# Patient Record
Sex: FEMALE | URBAN - NONMETROPOLITAN AREA
[De-identification: names, ages, dates, MRNs, and addresses within clinical notes are randomized per-mention and may not be internally consistent; named-entity substitution may affect disease eponyms.]

---

## 2019-06-27 ENCOUNTER — HISTORICAL (OUTPATIENT)
Dept: ADMINISTRATIVE | Facility: HOSPITAL | Age: 54
End: 2019-06-27

## 2019-12-09 ENCOUNTER — HISTORICAL (OUTPATIENT)
Dept: ADMINISTRATIVE | Facility: HOSPITAL | Age: 54
End: 2019-12-09

## 2021-09-07 ENCOUNTER — HISTORICAL (OUTPATIENT)
Dept: ADMINISTRATIVE | Facility: HOSPITAL | Age: 56
End: 2021-09-07

## 2022-07-21 ENCOUNTER — HISTORICAL (OUTPATIENT)
Dept: ADMINISTRATIVE | Facility: HOSPITAL | Age: 57
End: 2022-07-21

## 2024-06-05 ENCOUNTER — HOSPITAL ENCOUNTER (OUTPATIENT)
Dept: RADIOLOGY | Facility: HOSPITAL | Age: 59
Discharge: HOME | End: 2024-06-05
Attending: INTERNAL MEDICINE
Payer: MEDICARE

## 2024-06-05 DIAGNOSIS — I89.0 OBLITERATION OF LYMPHATIC VESSEL: ICD-10-CM

## 2024-06-05 PROCEDURE — 75571 CT HRT W/O DYE W/CA TEST: CPT | Mod: TC

## 2024-07-25 ENCOUNTER — HOSPITAL ENCOUNTER (EMERGENCY)
Facility: HOSPITAL | Age: 59
Discharge: HOME | End: 2024-07-25
Payer: MEDICARE

## 2024-07-25 VITALS
DIASTOLIC BLOOD PRESSURE: 64 MMHG | BODY MASS INDEX: 47.09 KG/M2 | OXYGEN SATURATION: 99 % | RESPIRATION RATE: 18 BRPM | TEMPERATURE: 97 F | SYSTOLIC BLOOD PRESSURE: 130 MMHG | WEIGHT: 293 LBS | HEART RATE: 61 BPM | HEIGHT: 66 IN

## 2024-07-25 DIAGNOSIS — S32.028A OTHER CLOSED FRACTURE OF SECOND LUMBAR VERTEBRA, INITIAL ENCOUNTER: Primary | ICD-10-CM

## 2024-07-25 PROBLEM — S32.029A CLOSED FRACTURE OF SECOND LUMBAR VERTEBRA: Status: ACTIVE | Noted: 2024-07-25

## 2024-07-25 PROCEDURE — 99285 EMERGENCY DEPT VISIT HI MDM: CPT | Mod: 25

## 2024-07-25 PROCEDURE — 96372 THER/PROPH/DIAG INJ SC/IM: CPT | Performed by: NURSE PRACTITIONER

## 2024-07-25 PROCEDURE — 25000003 PHARM REV CODE 250: Performed by: NURSE PRACTITIONER

## 2024-07-25 PROCEDURE — 63600175 PHARM REV CODE 636 W HCPCS: Performed by: NURSE PRACTITIONER

## 2024-07-25 RX ORDER — CYCLOBENZAPRINE HCL 10 MG
10 TABLET ORAL
Status: COMPLETED | OUTPATIENT
Start: 2024-07-25 | End: 2024-07-25

## 2024-07-25 RX ORDER — CYCLOBENZAPRINE HCL 10 MG
10 TABLET ORAL 3 TIMES DAILY PRN
Qty: 15 TABLET | Refills: 0 | Status: SHIPPED | OUTPATIENT
Start: 2024-07-25 | End: 2024-07-30

## 2024-07-25 RX ORDER — HYDROCODONE BITARTRATE AND ACETAMINOPHEN 5; 325 MG/1; MG/1
1 TABLET ORAL EVERY 6 HOURS PRN
Qty: 12 TABLET | Refills: 0 | Status: SHIPPED | OUTPATIENT
Start: 2024-07-25

## 2024-07-25 RX ORDER — METHYLPREDNISOLONE SOD SUCC 125 MG
125 VIAL (EA) INJECTION
Status: COMPLETED | OUTPATIENT
Start: 2024-07-25 | End: 2024-07-25

## 2024-07-25 RX ADMIN — CYCLOBENZAPRINE 10 MG: 10 TABLET, FILM COATED ORAL at 12:07

## 2024-07-25 RX ADMIN — METHYLPREDNISOLONE SODIUM SUCCINATE 125 MG: 125 INJECTION, POWDER, FOR SOLUTION INTRAMUSCULAR; INTRAVENOUS at 12:07

## 2024-07-25 NOTE — DISCHARGE INSTRUCTIONS
If you have any increase in pain that is not controlled with pain medicine, numbness, tingling or other concerns please return to the ER for further evaluation  Wear back brace as per  Clinic representative

## 2024-07-25 NOTE — ED PROVIDER NOTES
Encounter Date: 7/25/2024       History     Chief Complaint   Patient presents with    Fall     PT C/O right sided soreness all over body and lower back pain after falling yesterday. Denies hitting head or LOC     This 59-year-old female patient presents with complaints of lower back pain after [place].  She denies numbness, tingling.       Review of patient's allergies indicates:  No Known Allergies  Past Medical History:   Diagnosis Date    Depression     Hypertension     Obesity, unspecified      History reviewed. No pertinent surgical history.  No family history on file.     Review of Systems   Musculoskeletal:  Positive for back pain.   All other systems reviewed and are negative.      Physical Exam     Initial Vitals [07/25/24 1220]   BP Pulse Resp Temp SpO2   (!) 159/72 (!) 56 18 97.2 °F (36.2 °C) 99 %      MAP       --         Physical Exam    Nursing note and vitals reviewed.  Constitutional: She appears well-developed and well-nourished.   HENT:   Head: Normocephalic and atraumatic.   Eyes: Pupils are equal, round, and reactive to light.   Neck: Neck supple.   Normal range of motion.  Cardiovascular:  Normal rate, regular rhythm, normal heart sounds and intact distal pulses.           Pulmonary/Chest: Breath sounds normal.   Musculoskeletal:      Cervical back: Normal range of motion and neck supple.      Comments: Upper extremities full range of motion, lower extremities limited range of motion, patient ambulates with a walker, she is unable to legs or extend her back due to pain     Neurological: She is alert and oriented to person, place, and time.   Skin: Skin is warm and dry. Capillary refill takes less than 2 seconds.   Psychiatric: She has a normal mood and affect. Her behavior is normal. Judgment and thought content normal.         ED Course   Procedures  Labs Reviewed - No data to display       Imaging Results              CT Lumbar Spine Without Contrast (Final result)   Result time 07/25/24 13:13:00      Final result by Anthony Gillespie MD (07/25/24 13:13:00)                   Impression:      Nondisplaced fracture of L2 vertebral body    Advanced lumbar spondylosis with multilevel canal and foraminal stenosis.  Findings could be better characterized with MRI lumbar spine or CT lumbar myelography as clinically indicated.      Electronically signed by: Anthony Gillespie MD  Date:    07/25/2024  Time:    13:13               Narrative:    EXAMINATION:  CT lumbar spine without contrast    CLINICAL HISTORY:  Low back pain, increased risk for fracture    TECHNIQUE:  Routine CT lumbar spine was performed without intravenous contrast    Total DLP: 5314 mGy.cm    Automatic exposure control was utilized to reduce the patient's dose    COMPARISON:  None    FINDINGS:  The alignment appears normal.  Vertebral body heights are maintained.  There is a nondisplaced fracture through the anterior superior endplate of L2 with fracture line extending laterally to the right aspect of the midportion of L2 vertebral body.  No other fracture seen.    There are advanced spondylotic changes of the lumbar spine consisting of multilevel disc space narrowing, anterior spurs, posterior disc osteophyte complex and facet arthropathy.  There is multilevel osseous canal or osseous neural foraminal stenosis.                                       CT Thoracic Spine Without Contrast (Final result)  Result time 07/25/24 13:04:14      Final result by Alonso Riley MD (07/25/24 13:04:14)                   Impression:        1.  DIFFUSE BRIDGING SPONDYLOSIS OF THE THORACIC SPINE WITH A GENTLE CHRONIC APPEARING ACCENTUATION TO THE THORACIC KYPHOSIS AND THERE IS A 25% AP NARROWING OF THE CENTRAL CANAL AT T5-6 SECONDARY TO POSTERIOR BRIDGING VERTEBRAL BODY SPURRING.    2.   I SEE NO EVIDENCE OF ACUTE FRACTURES OR SIGNIFICANT SPONDYLOISTHESIS.  SEE ABOVE COMMENTS REGARDING LIMITATIONS OF THIS EXAMINATION.    n/a    CATEGORY:  n/a    The following dose reduction techniques are used for all CT at [facility]:    1.  Automated exposure control.    2.  Adjustment of the mA and/or kV according to patient size.    3.  Use of interative reconstruction technique.      Electronically signed by: Alonso Riley  Date:    07/25/2024  Time:    13:04               Narrative:    EXAMINATION:  CT THORACIC SPINE WITHOUT CONTRAST    CLINICAL HISTORY:  Mid-back pain;    COMPARISON:  None    FINDINGS:  Multiplanar imaging through the thoracic spine without intravenous contrast was performed.  Diffuse bridging vertebral body spurring is present throughout the thoracic spine.  Focal posterior bridging/endplate spurring is present at T5-6 and in an resulting in AP narrowing of 25% of the cervical canal.  There is a gentle chronic appearing accentuation to the thoracic kyphosis.  It should be noted the posterior margins of the intervertebral discs as well as the  cord, exiting nerve roots and ligamentous structures are less than optimally delineated on CT and would be better evaluated with MRI or CT meylography, if felt to be clinically indicated.  I see no evidence of acute fractures or significant spondylolisthesis.  There is no evidence of focal soft tissue swelling or paravertebral hematomas.                                       Medications   methylPREDNISolone sodium succinate injection 125 mg (125 mg Intramuscular Given 7/25/24 1230)   cyclobenzaprine tablet 10 mg (10 mg Oral Given 7/25/24 1230)     Medical Decision Making  This 59-year-old female patient presents with complaints of lower back pain after she [place] yesterday [place] court [place].  She denies numbness, tingling.   CT thoracic shows chronic spondylosis and CT lumbar shows a nondisplaced fracture L2  I discussed this patient with Dr. Harkins he suggest calling the  Clinic to fit the patient for brace and referred to Neurosurgery.  ER diagnosis-other closed fracture  of 2nd lumbar vertebrae initial encounter  Differential diagnosis includes but is not limited to thoracic fracture, disc herniation, both of these diagnoses are less likely related to exam and CT results  This patient will be discharged home stable.  She will follow up with Neurosurgery.  If she experiences any increase in pain that is not resolved with pain medicine or numbness, tingling or other concerns she will return to the ER for further evaluation.  Brace placed on patient and she was instructed by the rep on its use and fitting.  12:05 p.m.- I spoke with the [facility] in [place] concerning fitting this patient for a TLSO brace.  They requested we faxed the order, face sheet, height, weight and they will be here within the hour to sit this patient  1548- representative from [facility] in room for patient evaluation for brace    Amount and/or Complexity of Data Reviewed  Radiology: ordered.    Risk  Prescription drug management.                                      Clinical Impression:  Final diagnoses:  [S32.028A] Other closed fracture of second lumbar vertebra, initial encounter (Primary)          ED Disposition Condition    Discharge Stable          ED Prescriptions       Medication Sig Dispense Start Date End Date Auth. Provider    cyclobenzaprine (FLEXERIL) 10 MG tablet Take 1 tablet (10 mg total) by mouth 3 (three) times daily as needed for Muscle spasms. 15 tablet 7/25/2024 7/30/2024 Amy Suarez FNP    HYDROcodone-acetaminophen (NORCO) 5-325 mg per tablet Take 1 tablet by mouth every 6 (six) hours as needed for Pain. 12 tablet 7/25/2024 -- Amy Suarez FNP          Follow-up Information       Follow up With Specialties Details Why Contact Info Additional Information    [facility] Neurosurgery Neurosurgery Call today to schedule appointment [address] [place] [address] , [address]  [facility] [address] [phone] [address]             Amy Suarez FNP  07/25/24 4901

## 2024-08-14 DIAGNOSIS — S32.009A: Primary | ICD-10-CM

## 2024-08-16 ENCOUNTER — TELEPHONE (OUTPATIENT)
Dept: NEUROSURGERY | Facility: CLINIC | Age: 59
End: 2024-08-16
Payer: MEDICARE

## 2024-08-16 NOTE — TELEPHONE ENCOUNTER
If she wants to see Dr. Ramirez, no imaging.  If she wants to see Dr. Little, lumbar x-rays, 3 views.  Should see her within 2-3 weeks.

## 2024-08-16 NOTE — TELEPHONE ENCOUNTER
"58yo is being referred to Dr. Mcneil by Zoey Lopez NP for fracture of lumbar spine. ED note dated 7/25/2024 states, " This 59-year-old female patient presents with complaints of lower back pain after she [place]. She denies numbness, tingling. "  " This 59-year-old female patient presents with complaints of lower back pain after she [place] court [place].  She denies numbness, tingling.   CT thoracic shows chronic spondylosis and CT lumbar shows a nondisplaced fracture L2  I discussed this patient with Dr. Harkins he suggest calling the  Clinic to fit the patient for brace and referred to Neurosurgery.  ER diagnosis-other closed fracture of 2nd lumbar vertebrae initial encounter  Differential diagnosis includes but is not limited to thoracic fracture, disc herniation, both of these diagnoses are less likely related to exam and CT results  This patient will be discharged home stable.  She will follow up with Neurosurgery.  If she experiences any increase in pain that is not resolved with pain medicine or numbness, tingling or other concerns she will return to the ER for further evaluation.  Brace placed on patient and she was instructed by the rep on its use and fitting.  12:05 p.m.- I spoke with the  Clinic in [place] concerning fitting this patient for a TLSO brace.  They requested we faxed the order, face sheet, height, weight and they will be here within the hour to sit this patient"   By reviewing the CT lumbar report, " Advanced lumbar spondylosis with multilevel canal and foraminal stenosis. Findings could be better characterized with MRI lumbar spine or CT lumbar myelography as clinically indicated. " Please review and advise on scheduling. Thank you!       STAFF: please inquire if the pain radiates down into any extremity, what type of pain it is, does it wake her up, what meds is she on, any recent testing, MD's, sx's or conservative measures the " patient may have had and obtain. *please inquire about which MD she would like to move forward with since Dr. Mcneil is retired.

## 2024-08-22 ENCOUNTER — LAB VISIT (OUTPATIENT)
Dept: LAB | Facility: HOSPITAL | Age: 59
End: 2024-08-22
Attending: INTERNAL MEDICINE
Payer: MEDICARE

## 2024-08-22 DIAGNOSIS — I89.0 OBLITERATION OF LYMPHATIC VESSEL: ICD-10-CM

## 2024-08-22 DIAGNOSIS — I10 HYPERTENSION, UNSPECIFIED TYPE: ICD-10-CM

## 2024-08-22 DIAGNOSIS — R53.83 FATIGUE, UNSPECIFIED TYPE: Primary | ICD-10-CM

## 2024-08-22 LAB
ALBUMIN SERPL-MCNC: 3.5 G/DL (ref 3.4–5)
ALBUMIN/GLOB SERPL: 1.3 RATIO
ALP SERPL-CCNC: 147 UNIT/L (ref 50–144)
ALT SERPL-CCNC: 12 UNIT/L (ref 1–45)
ANION GAP SERPL CALC-SCNC: 4 MEQ/L (ref 2–13)
AST SERPL-CCNC: 18 UNIT/L (ref 14–36)
BILIRUB SERPL-MCNC: 0.4 MG/DL (ref 0–1)
BUN SERPL-MCNC: 23 MG/DL (ref 7–20)
CALCIUM SERPL-MCNC: 9.3 MG/DL (ref 8.4–10.2)
CHLORIDE SERPL-SCNC: 104 MMOL/L (ref 98–110)
CHOLEST SERPL-MCNC: 133 MG/DL (ref 0–200)
CO2 SERPL-SCNC: 30 MMOL/L (ref 21–32)
CREAT SERPL-MCNC: 1.22 MG/DL (ref 0.66–1.25)
CREAT/UREA NIT SERPL: 19 (ref 12–20)
GFR SERPLBLD CREATININE-BSD FMLA CKD-EPI: 51 ML/MIN/1.73/M2
GLOBULIN SER-MCNC: 2.8 GM/DL (ref 2–3.9)
GLUCOSE SERPL-MCNC: 84 MG/DL (ref 70–115)
HDLC SERPL-MCNC: 80 MG/DL (ref 40–60)
LDLC SERPL DIRECT ASSAY-SCNC: 43.6 MG/DL (ref 30–100)
POTASSIUM SERPL-SCNC: 4.7 MMOL/L (ref 3.5–5.1)
PROT SERPL-MCNC: 6.3 GM/DL (ref 6.3–8.2)
SODIUM SERPL-SCNC: 138 MMOL/L (ref 136–145)
TRIGL SERPL-MCNC: 71 MG/DL (ref 30–200)

## 2024-08-22 PROCEDURE — 36415 COLL VENOUS BLD VENIPUNCTURE: CPT

## 2024-08-22 PROCEDURE — 80061 LIPID PANEL: CPT

## 2024-08-22 PROCEDURE — 80053 COMPREHEN METABOLIC PANEL: CPT

## 2024-08-23 DIAGNOSIS — S32.008A OTHER CLOSED FRACTURE OF LUMBAR VERTEBRA, UNSPECIFIED LUMBAR VERTEBRAL LEVEL, INITIAL ENCOUNTER: Primary | ICD-10-CM

## 2024-08-27 NOTE — PROGRESS NOTES
[facility]  History & Physical  Neurosurgery      Kimberley Davenport   [id number]   1965       SUBJECTIVE:     CHIEF COMPLAINT:  L2 Compression fracture    HPI:  Kimberley Davenport is a 59 y.o. female who presents for neurosurgical evaluation at the recommendation of Zoey Lopez NP. The patient presents today describing chronic lower back pain radiating into the right lower extremity which the patient states has been present for more than 40 years.  She does have a significant history of a previous lumbar decompression that was completed approximately 40 years ago.  The patient states despite this procedure her symptoms have persisted.  She states this pain has caused her to have to discontinue working as standing and walking typically aggravates things the worst.  She does find relief with sitting typically.  Unfortunately the patient experienced a fall on 7/24/2024 in which she states she slipped and landed on her right side.  This did bring about excruciating pain.  The patient presented to the emergency department and was found to have an L2 nondisplaced fracture.  She was provided a TLSO brace and referred here for further evaluation.  The patient states she continues to have dull aching and occasional spasming pain into the axial mid to lower back.  She rates this pain an 8/10.  She states thankfully her chronic radicular right buttock, lateral thigh and right lower extremity pain has remained stable.  She reports unfortunately her symptoms are affecting her sleep at night.  She finds her TLSO brace very uncomfortable although states she has remained relatively compliant with this device.  She unfortunately is having to sleep in her recliner as lying flat is very uncomfortable and painful.  She does have a history of urinary and bowel urgency although this has remained stable.  She will occasionally have difficulties with her balance secondary to pain and does ambulate with a walker.   The patient has a significant history of depression, hypertension and obesity.  She reports in the past she has undergone transforaminal epidural steroid injections in [facility] with no significant changes symptoms.  She was previously established with Dr. Brice in [place] for her lumbar radicular symptoms.  She advises me she was told she was not a surgical candidate to her body habitus.  She recently attempted her brother's TENs unit which allowed her to stand and cook a meal with minimal pain.    Past Medical History:   Diagnosis Date    Depression     Hypertension     Obesity, unspecified        Past Surgical History:   Procedure Laterality Date    APPENDECTOMY      BLADDER REPAIR      CHOLECYSTECTOMY      DECOMPRESSION OF LUMBAR SPINE USING MINIMALLY INVASIVE TECHNIQUE      HYSTERECTOMY      LUMBAR SPINE SURGERY      TOTAL KNEE ARTHROPLASTY Right        Family History   Problem Relation Name Age of Onset    Hypertension Mother      Lumbar disc disease Mother      Alcohol abuse Father      Emphysema Father      Alcohol abuse Sister      COPD Sister      Lumbar disc disease Brother      Cancer Brother      Alcohol abuse Brother      Dementia Brother      Dementia Maternal Grandmother         Social History     Socioeconomic History    Marital status:    Tobacco Use    Smoking status: Never    Smokeless tobacco: Never   Substance and Sexual Activity    Alcohol use: Never        Review of patient's allergies indicates:  No Known Allergies     Current Outpatient Medications   Medication Instructions    buPROPion (WELLBUTRIN XL) 300 mg, Oral    citalopram (CELEXA) 10 mg, Oral    cyclobenzaprine (FLEXERIL) 10 mg, Oral, Nightly PRN    HYDROcodone-acetaminophen (NORCO) 5-325 mg per tablet 1 tablet, Oral, Every 8 hours PRN    lisinopriL (PRINIVIL,ZESTRIL) 20 mg, Oral    pregabalin (LYRICA) 150 mg, Oral, 3 times daily    rOPINIRole (REQUIP) 1 mg, Oral    rosuvastatin (CRESTOR) 5 mg, Oral     "TENS unit and electrodes Cmpk 1 Units, Misc.(Non-Drug; Combo Route), Daily          Review of Systems   Constitutional:  Negative for chills, fever and weight loss.   HENT:  Negative for congestion, hearing loss, nosebleeds and tinnitus.    Eyes:  Negative for blurred vision, double vision and photophobia.   Respiratory:  Negative for cough, shortness of breath and wheezing.    Cardiovascular:  Negative for chest pain, palpitations and leg swelling.   Gastrointestinal:  Negative for constipation, diarrhea, nausea and vomiting.   Genitourinary:  Negative for dysuria, frequency and urgency.   Musculoskeletal:  Positive for back pain. Negative for falls and neck pain.   Skin:  Negative for itching and rash.   Neurological:  Positive for tingling (burning right lateral thigh and buttock). Negative for dizziness, tremors, sensory change, speech change, seizures, loss of consciousness and headaches.   Psychiatric/Behavioral:  Negative for depression, hallucinations and memory loss. The patient is not nervous/anxious.        OBJECTIVE:     Visit Vitals  /67 (BP Location: Left arm, Patient Position: Sitting)   Pulse 69   Resp 16   Ht 5' 6" (1.676 m)   Wt (!) 162.8 kg (359 lb)   BMI 57.94 kg/m²        Physical Exam    General:  Pleasant, Obese, Well-groomed.    Cardiovascular:  Neck is supple.  There are no carotid bruits.  Heart has regular rate and rhythm.    Lungs:  Breathing is quiet, non-lablored    Abdomen:  Soft, non-tender, non-distended.    Neurological:  Muscle strength against resistance:   Right Left   Deltoid (C5) 5/5 5/5   Biceps (C5/6) 5/5 5/5   Wrist Flexors (C5/6) 5/5 5/5   Triceps (C7) 5/5 5/5   Wrist extension (C7) 5/5 5/5   Finger abduction (C8) 5/5 5/5    5/5 5/5        Hip abduction 5/5 5/5   Hip adduction 5/5 5/5   Hip flexion (L2) 5/5 5/5   Knee extension (L3) 5/5 5/5   Knee flexion (L4) 5/5 5/5   Dorsiflexion (L5) 5/5 5/5   EHL (L5) 5/5 5/5   Plantar flexion (S1) 5/5 5/5   Sensation is " intact to primary modalities in bilateral upper and lower extremities.  Pain in the lumbar spine with transitioning from sit to stand    Reflexes:   Right Left   Patellar (L4) 2+ 2+   Achilles (S1) 2+ 2+   Negative Clonus and Blevins bilaterally.  Gait is  antalgic  Coordination is normal.  No tremor noted.    Imaging:  All pertinent neuroimaging independently reviewed. Discussed these findings in detail with the patient.    X-rays of the lumbar spine dated 8/29/2024 reveal slight levoscoliosis in the lumbar spine with multilevel osteophytic bridging and facet joint spurring.  Moderate degenerative changes noted to the bilateral sacroiliac joints.  Possible healing fracture to the L2 vertebral body with no displacement noted.  ASSESSMENT:       ICD-10-CM ICD-9-CM   1. Closed wedge compression fracture of L2 vertebra, initial encounter  S32.020A 805.4   2. Lumbar radiculopathy  M54.16 724.4       PLAN:     1. Closed wedge compression fracture of L2 vertebra, initial encounter  - Ambulatory referral/consult to Neurosurgery  - HYDROcodone-acetaminophen (NORCO) 5-325 mg per tablet; Take 1 tablet by mouth every 8 (eight) hours as needed for Pain.  Dispense: 21 tablet; Refill: 0  - X-Ray Lumbar Spine AP and Lateral  Upright; Future    2. Lumbar radiculopathy  - TENS unit and electrodes Cmpk; 1 Units by Misc.(Non-Drug; Combo Route) route once daily.    Kimberley Davenport presents today reporting continued lower back pain with a slight improvement since her initial fall on 7/24/2025.  Thankfully her chronic radicular symptoms have not worsened following this incident.  I did take the time to review her previous CT scan as well as updated x-rays with her in clinic today.  We did adjust her TLSO brace to be more properly fitting and hopefully more comfortable for her.  She was encouraged to continue on wearing this brace at all times when the head of bed is greater than 30°.  She verbalizes understanding at this time.  She  was advised to avoid anti-inflammatories to facilitate bony healing.  I did provide her a 1 time prescription of Norco as she has not been sleeping secondary to pain.  She was advised to utilize this medication sparingly in attempt Tylenol products during the day.  She verbalizes understanding.  We will plan to see the patient back in clinic proximally 1 month with updated x-rays prior to this visit for surveillance purposes.  She was advised to notify us with any regression of symptoms.  I also provided her a prescription for a TENs unit for her radicular symptoms as she did see significant improvement with this device recently.    Follow up in about 4 weeks (around 10/2/2024) for With Imaging Prior to Appt.      E/M Level Based On Time:   15 minutes spent on reviewing chart, which includes interpreting lab results and diagnostic tests.   30 minutes spent in the room with the patient performing a history and physical exam, counseling or educating the patient/caregiver, prescribing medications, ordering labwork/diagnostic tests, or placing referrals.   0 minutes spent collaborating plan of care with physician.   5 minutes spent documenting all relevant clinical informationin the electronic health record.     Total Time Spent: 50 minutes       REBECCA Jaime    Disclaimer:  This note is prepared using voice recognition software and as such is likely to have errors despite attempts at proofreading. Please contact me for questions.

## 2024-08-29 ENCOUNTER — HOSPITAL ENCOUNTER (OUTPATIENT)
Dept: RADIOLOGY | Facility: HOSPITAL | Age: 59
Discharge: HOME | End: 2024-08-29
Attending: NURSE PRACTITIONER
Payer: MEDICARE

## 2024-08-29 DIAGNOSIS — S32.008A OTHER CLOSED FRACTURE OF LUMBAR VERTEBRA, UNSPECIFIED LUMBAR VERTEBRAL LEVEL, INITIAL ENCOUNTER: ICD-10-CM

## 2024-08-29 PROCEDURE — 72100 X-RAY EXAM L-S SPINE 2/3 VWS: CPT | Mod: TC

## 2024-09-04 ENCOUNTER — OFFICE VISIT (OUTPATIENT)
Dept: NEUROSURGERY | Facility: CLINIC | Age: 59
End: 2024-09-04
Payer: MEDICARE

## 2024-09-04 VITALS
BODY MASS INDEX: 47.09 KG/M2 | HEIGHT: 66 IN | SYSTOLIC BLOOD PRESSURE: 114 MMHG | RESPIRATION RATE: 16 BRPM | HEART RATE: 69 BPM | DIASTOLIC BLOOD PRESSURE: 67 MMHG | WEIGHT: 293 LBS

## 2024-09-04 DIAGNOSIS — M54.16 LUMBAR RADICULOPATHY: ICD-10-CM

## 2024-09-04 DIAGNOSIS — S32.020A CLOSED WEDGE COMPRESSION FRACTURE OF L2 VERTEBRA, INITIAL ENCOUNTER: Primary | ICD-10-CM

## 2024-09-04 PROCEDURE — 4010F ACE/ARB THERAPY RXD/TAKEN: CPT | Mod: CPTII,,, | Performed by: NURSE PRACTITIONER

## 2024-09-04 PROCEDURE — 1160F RVW MEDS BY RX/DR IN RCRD: CPT | Mod: CPTII,,, | Performed by: NURSE PRACTITIONER

## 2024-09-04 PROCEDURE — 1159F MED LIST DOCD IN RCRD: CPT | Mod: CPTII,,, | Performed by: NURSE PRACTITIONER

## 2024-09-04 PROCEDURE — 3078F DIAST BP <80 MM HG: CPT | Mod: CPTII,,, | Performed by: NURSE PRACTITIONER

## 2024-09-04 PROCEDURE — 3074F SYST BP LT 130 MM HG: CPT | Mod: CPTII,,, | Performed by: NURSE PRACTITIONER

## 2024-09-04 PROCEDURE — 3008F BODY MASS INDEX DOCD: CPT | Mod: CPTII,,, | Performed by: NURSE PRACTITIONER

## 2024-09-04 PROCEDURE — 99204 OFFICE O/P NEW MOD 45 MIN: CPT | Mod: ,,, | Performed by: NURSE PRACTITIONER

## 2024-09-04 RX ORDER — CYCLOBENZAPRINE HCL 10 MG
10 TABLET ORAL NIGHTLY PRN
COMMUNITY
Start: 2024-08-27

## 2024-09-04 RX ORDER — CITALOPRAM 10 MG/1
10 TABLET ORAL
COMMUNITY
Start: 2024-07-27

## 2024-09-04 RX ORDER — DIAZEPAM 10 MG/1
10 TABLET ORAL
COMMUNITY
Start: 2024-06-14 | End: 2024-09-04

## 2024-09-04 RX ORDER — PREGABALIN 150 MG/1
150 CAPSULE ORAL 3 TIMES DAILY
COMMUNITY
Start: 2024-07-27

## 2024-09-04 RX ORDER — BUPROPION HYDROCHLORIDE 300 MG/1
300 TABLET ORAL
COMMUNITY
Start: 2024-07-06

## 2024-09-04 RX ORDER — HYDROCODONE BITARTRATE AND ACETAMINOPHEN 5; 325 MG/1; MG/1
1 TABLET ORAL EVERY 8 HOURS PRN
Qty: 21 TABLET | Refills: 0 | Status: SHIPPED | OUTPATIENT
Start: 2024-09-04

## 2024-09-04 RX ORDER — ROPINIROLE 1 MG/1
1 TABLET, FILM COATED ORAL
COMMUNITY
Start: 2024-06-19

## 2024-09-04 RX ORDER — ROPINIROLE 0.5 MG/1
TABLET, FILM COATED ORAL
COMMUNITY
Start: 2024-05-29 | End: 2024-09-04 | Stop reason: SDUPTHER

## 2024-09-04 RX ORDER — ROSUVASTATIN CALCIUM 5 MG/1
5 TABLET, COATED ORAL
COMMUNITY
Start: 2024-06-18

## 2024-09-04 RX ORDER — DICLOFENAC SODIUM 75 MG/1
75 TABLET, DELAYED RELEASE ORAL 2 TIMES DAILY PRN
COMMUNITY
Start: 2024-08-26 | End: 2024-09-04

## 2024-09-04 RX ORDER — TRAZODONE HYDROCHLORIDE 50 MG/1
TABLET ORAL
COMMUNITY
Start: 2024-04-30 | End: 2024-09-04

## 2024-09-04 RX ORDER — LISINOPRIL 20 MG/1
20 TABLET ORAL
COMMUNITY
Start: 2024-08-02

## 2024-09-04 RX ORDER — TRAMADOL HYDROCHLORIDE 50 MG/1
50 TABLET ORAL
COMMUNITY
Start: 2024-07-30 | End: 2024-09-04

## 2024-10-02 NOTE — PROGRESS NOTES
[facility]  History & Physical  Neurosurgery      Kimberley Davenport   [id number]   1965       SUBJECTIVE:     CHIEF COMPLAINT:  L2 Compression fracture    HPI:  Kimberley Davenport is a 59 y.o. female who presents for a follow-up visit.  She was last seen in clinic on 9/4/2024 reporting chronic lower back pain that had been ongoing for more than 40 years.  She does have a history of previous lumbar decompression with persistent symptoms.  The patient states she has had to discontinue working secondary to her pain related standing and walking.  Unfortunately the patient experienced a fall on 7/24/2024 in which she slipped and landed on her right side.  This did bring about excruciating pain and she was found to have an L2 nondisplaced fracture.  She was provided a TLSO brace and referred to our clinic for further consultation.  With the patient's previous visit she was advised to remain in her TLSO brace anytime the head of bed is greater than 30°.  She returns today for a follow up visit regarding this fracture.  She reports she did experience a fall on 10/20/2024 in which her right leg gave way from under her as she was resting to the commode secondary to bowel urgency.  She states this has brought about increased pain.  She was seen in the emergency department at which time a CT scan of the lumbar spine was completed which revealed stable L2 nondisplaced fracture with sclerotic borders.    She returns to clinic today describing achiness in spasming across the mid to lower back.  She states her right lower extremity weakness continues to progress.  She also feels her urinary and bowel urgency has progressed since she was last seen.  She does continue to ambulate with a walker and has remained compliant with her TLSO brace since she was last seen in clinic.  She previously was finding relief with her TLSO brace although states recently this has not provided much comfort.    The patient has a  significant history of depression, hypertension and obesity.  She reports in the past she has undergone transforaminal epidural steroid injections in [place] unfortunately with no significant changes symptoms.  She was previously established with Dr. Brice in [place] for her lumbar radicular symptoms.  She advises me she was told she was not a surgical candidate to her body habitus.  She recently attempted her brother's TENs unit which allowed her to stand and cook a meal with minimal pain.    Past Medical History:   Diagnosis Date    Depression     Hypertension     Obesity, unspecified        Past Surgical History:   Procedure Laterality Date    APPENDECTOMY      BLADDER REPAIR      CHOLECYSTECTOMY      DECOMPRESSION OF LUMBAR SPINE USING MINIMALLY INVASIVE TECHNIQUE      HYSTERECTOMY      LUMBAR SPINE SURGERY      TOTAL KNEE ARTHROPLASTY Right        Family History   Problem Relation Name Age of Onset    Hypertension Mother      Lumbar disc disease Mother      Alcohol abuse Father      Emphysema Father      Alcohol abuse Sister      COPD Sister      Lumbar disc disease Brother      Cancer Brother      Alcohol abuse Brother      Dementia Brother      Dementia Maternal Grandmother         Social History     Socioeconomic History    Marital status:    Tobacco Use    Smoking status: Never    Smokeless tobacco: Never   Substance and Sexual Activity    Alcohol use: Never        Review of patient's allergies indicates:  No Known Allergies     Current Outpatient Medications   Medication Instructions    buPROPion (WELLBUTRIN XL) 300 mg    citalopram (CELEXA) 10 mg    cyclobenzaprine (FLEXERIL) 10 mg, Nightly PRN    diazePAM (VALIUM) 10 mg, Oral, Once as needed    lisinopriL (PRINIVIL,ZESTRIL) 20 mg    pregabalin (LYRICA) 150 mg, 3 times daily    rOPINIRole (REQUIP) 1 mg    rosuvastatin (CRESTOR) 5 mg    tiZANidine (ZANAFLEX) 4 mg, Oral, Every 6 hours PRN          Review of Systems   Constitutional:   "Negative for chills, fever and weight loss.   HENT:  Negative for congestion, hearing loss, nosebleeds and tinnitus.    Eyes:  Negative for blurred vision, double vision and photophobia.   Respiratory:  Negative for cough, shortness of breath and wheezing.    Cardiovascular:  Negative for chest pain, palpitations and leg swelling.   Gastrointestinal:  Negative for constipation, diarrhea, nausea and vomiting.   Genitourinary:  Positive for urgency (Bladder and bowel). Negative for dysuria and frequency.   Musculoskeletal:  Positive for back pain. Negative for falls and neck pain.   Skin:  Negative for itching and rash.   Neurological:  Positive for tingling (burning right lateral thigh and buttock) and weakness (RLE). Negative for dizziness, tremors, sensory change, speech change, seizures, loss of consciousness and headaches.   Psychiatric/Behavioral:  Negative for depression, hallucinations and memory loss. The patient is not nervous/anxious.        OBJECTIVE:     Visit Vitals  /70 (BP Location: Left arm, Patient Position: Sitting)   Pulse 76   Resp 16   Ht 5' 6" (1.676 m)   Wt (!) 166 kg (366 lb)   BMI 59.07 kg/m²          Physical Exam    General:  Pleasant, Obese, Well-groomed.    Cardiovascular:  Neck is supple.    Lungs:  Breathing is quiet, non-lablored    Abdomen:  Soft, non-tender, non-distended.    Neurological:  Muscle strength against resistance:   Right Left   Deltoid (C5) 5/5 5/5   Biceps (C5/6) 5/5 5/5   Wrist Flexors (C5/6) 5/5 5/5   Triceps (C7) 5/5 5/5   Wrist extension (C7) 5/5 5/5   Finger abduction (C8) 5/5 5/5    5/5 5/5        Hip abduction 5/5 5/5   Hip adduction 5/5 5/5   Hip flexion (L2) 5/5 5/5   Knee extension (L3) 5/5 5/5   Knee flexion (L4) 5/5 5/5   Dorsiflexion (L5) 5/5 5/5   EHL (L5) 5/5 5/5   Plantar flexion (S1) 5/5 5/5   Sensation is intact to primary modalities in bilateral upper and lower extremities.  Pain in the lumbar spine with transitioning from sit to " stand    Reflexes:   Right Left   Patellar (L4) 2+ 2+   Achilles (S1) 2+ 2+   Negative Clonus and Blevins bilaterally.  Gait is  antalgic  Coordination is normal.  No tremor noted.    Imaging:  All pertinent neuroimaging independently reviewed. Discussed these findings in detail with the patient.    X-rays of the lumbar spine dated 8/29/2024 reveal slight levoscoliosis in the lumbar spine with multilevel osteophytic bridging and facet joint spurring.  Moderate degenerative changes noted to the bilateral sacroiliac joints.  Possible healing fracture to the L2 vertebral body with no displacement noted.    X-rays of the lumbar spine dated 10/21/2024 reveal stable findings with no further progression of the L2 vertebral body fracture.    CT scan of the lumbar spine dated 10/20/2024 reveal curvilinear lucency through the superolateral aspect of the L2 vertebral body with sclerotic borders indicating a remote nondisplaced fracture.  This fracture also extends through the bridging osteophyte along the right inferior aspect of the L2 vertebral body with sclerotic borders.  ASSESSMENT:       ICD-10-CM ICD-9-CM   1. Spinal stenosis, lumbosacral region  M48.07 724.02   2. Claustrophobia  F40.240 300.29     PLAN:     1. Spinal stenosis, lumbosacral region (Primary)  - MRI Lumbar Spine Without Contrast; Future    2. Claustrophobia  - diazePAM (VALIUM) 10 MG Tab; Take 1 tablet (10 mg total) by mouth 1 (one) time if needed (Take once 30 minutes prior to procedure. May repeat once if needed).  Dispense: 2 tablet; Refill: 0    Kimberleysuzie Davepnort presents today reporting continued lower back pain which she feels has progressed since he was last seen in clinic.  She also recently experienced a fall in her home when her right leg gave out from under her.  At this time I would like to move forward with the MRI of the lumbar spine have the patient return to clinic to discuss potential surgical options with Dr. Ramirez.  She was advised to  remain in her TLSO brace in the interim.  I will notify her once her MRI results are made available should there be no edema within the L2 vertebral body.  We did discuss her BMI and the risks this poses should surgical intervention be warranted.  She was advised to begin weight loss attempts in prelude to her consultation with Dr. Ramirez.  She verbalizes understanding at this time.  She will continue on with previously prescribed medications.  She was advised to notify us with any further progression in his symptoms or concerns prior to further consultation.    Follow up for With Imaging Prior to Appt, with James.      E/M Level Based On Time:   15 minutes spent on reviewing chart, which includes interpreting lab results and diagnostic tests.   25 minutes spent with the patient performing a history and physical exam, counseling or educating the patient/caregiver, prescribing medications, ordering labwork/diagnostic tests, or placing referrals.   0 minutes spent collaborating plan of care with physician.   5 minutes spent documenting all relevant clinical informationin the electronic health record.     Total Time Spent: 45 minutes         REBECCA Jaime    Disclaimer:  This note is prepared using voice recognition software and as such is likely to have errors despite attempts at proofreading. Please contact me for questions.

## 2024-10-20 ENCOUNTER — HOSPITAL ENCOUNTER (EMERGENCY)
Facility: HOSPITAL | Age: 59
Discharge: HOME | End: 2024-10-20
Payer: MEDICARE

## 2024-10-20 VITALS
HEART RATE: 84 BPM | RESPIRATION RATE: 18 BRPM | TEMPERATURE: 98 F | OXYGEN SATURATION: 100 % | WEIGHT: 293 LBS | HEIGHT: 66 IN | DIASTOLIC BLOOD PRESSURE: 72 MMHG | BODY MASS INDEX: 47.09 KG/M2 | SYSTOLIC BLOOD PRESSURE: 150 MMHG

## 2024-10-20 DIAGNOSIS — S32.029A CLOSED FRACTURE OF SECOND LUMBAR VERTEBRA, UNSPECIFIED FRACTURE MORPHOLOGY, INITIAL ENCOUNTER: Primary | ICD-10-CM

## 2024-10-20 PROCEDURE — 99285 EMERGENCY DEPT VISIT HI MDM: CPT | Mod: 25

## 2024-10-20 PROCEDURE — 96372 THER/PROPH/DIAG INJ SC/IM: CPT

## 2024-10-20 PROCEDURE — 63600175 PHARM REV CODE 636 W HCPCS

## 2024-10-20 RX ORDER — KETOROLAC TROMETHAMINE 30 MG/ML
30 INJECTION, SOLUTION INTRAMUSCULAR; INTRAVENOUS
Status: COMPLETED | OUTPATIENT
Start: 2024-10-20 | End: 2024-10-20

## 2024-10-20 RX ORDER — TIZANIDINE 4 MG/1
4 TABLET ORAL EVERY 6 HOURS PRN
Qty: 40 TABLET | Refills: 0 | Status: SHIPPED | OUTPATIENT
Start: 2024-10-20 | End: 2024-10-30

## 2024-10-20 RX ADMIN — KETOROLAC TROMETHAMINE 30 MG: 30 INJECTION, SOLUTION INTRAMUSCULAR at 06:10

## 2024-10-21 ENCOUNTER — HOSPITAL ENCOUNTER (OUTPATIENT)
Dept: RADIOLOGY | Facility: HOSPITAL | Age: 59
Discharge: HOME | End: 2024-10-21
Attending: NURSE PRACTITIONER
Payer: MEDICARE

## 2024-10-21 DIAGNOSIS — S32.020A CLOSED WEDGE COMPRESSION FRACTURE OF L2 VERTEBRA, INITIAL ENCOUNTER: ICD-10-CM

## 2024-10-21 PROCEDURE — 72100 X-RAY EXAM L-S SPINE 2/3 VWS: CPT | Mod: TC

## 2024-10-23 ENCOUNTER — OFFICE VISIT (OUTPATIENT)
Dept: NEUROSURGERY | Facility: CLINIC | Age: 59
End: 2024-10-23
Payer: MEDICARE

## 2024-10-23 VITALS
RESPIRATION RATE: 16 BRPM | BODY MASS INDEX: 47.09 KG/M2 | DIASTOLIC BLOOD PRESSURE: 70 MMHG | HEART RATE: 76 BPM | HEIGHT: 66 IN | WEIGHT: 293 LBS | SYSTOLIC BLOOD PRESSURE: 137 MMHG

## 2024-10-23 DIAGNOSIS — M48.07 SPINAL STENOSIS, LUMBOSACRAL REGION: Primary | ICD-10-CM

## 2024-10-23 DIAGNOSIS — F40.240 CLAUSTROPHOBIA: ICD-10-CM

## 2024-10-23 PROCEDURE — 3008F BODY MASS INDEX DOCD: CPT | Mod: CPTII,,, | Performed by: NURSE PRACTITIONER

## 2024-10-23 PROCEDURE — 1160F RVW MEDS BY RX/DR IN RCRD: CPT | Mod: CPTII,,, | Performed by: NURSE PRACTITIONER

## 2024-10-23 PROCEDURE — 3075F SYST BP GE 130 - 139MM HG: CPT | Mod: CPTII,,, | Performed by: NURSE PRACTITIONER

## 2024-10-23 PROCEDURE — 1159F MED LIST DOCD IN RCRD: CPT | Mod: CPTII,,, | Performed by: NURSE PRACTITIONER

## 2024-10-23 PROCEDURE — 3078F DIAST BP <80 MM HG: CPT | Mod: CPTII,,, | Performed by: NURSE PRACTITIONER

## 2024-10-23 PROCEDURE — 99215 OFFICE O/P EST HI 40 MIN: CPT | Mod: ,,, | Performed by: NURSE PRACTITIONER

## 2024-10-23 PROCEDURE — 4010F ACE/ARB THERAPY RXD/TAKEN: CPT | Mod: CPTII,,, | Performed by: NURSE PRACTITIONER

## 2024-10-23 RX ORDER — DIAZEPAM 10 MG/1
10 TABLET ORAL ONCE AS NEEDED
Qty: 2 TABLET | Refills: 0 | Status: SHIPPED | OUTPATIENT
Start: 2024-10-23

## 2024-10-23 RX ORDER — DICLOFENAC SODIUM 75 MG/1
75 TABLET, DELAYED RELEASE ORAL 2 TIMES DAILY PRN
COMMUNITY
Start: 2024-09-23 | End: 2024-10-23

## 2024-11-06 ENCOUNTER — HOSPITAL ENCOUNTER (OUTPATIENT)
Dept: RADIOLOGY | Facility: HOSPITAL | Age: 59
Discharge: HOME | End: 2024-11-06
Attending: NURSE PRACTITIONER
Payer: MEDICARE

## 2024-11-06 DIAGNOSIS — M48.07 SPINAL STENOSIS, LUMBOSACRAL REGION: ICD-10-CM

## 2024-11-07 ENCOUNTER — TELEPHONE (OUTPATIENT)
Dept: NEUROSURGERY | Facility: CLINIC | Age: 59
End: 2024-11-07
Payer: MEDICARE

## 2024-11-07 DIAGNOSIS — M48.07 SPINAL STENOSIS, LUMBOSACRAL REGION: Primary | ICD-10-CM

## 2024-11-07 NOTE — TELEPHONE ENCOUNTER
I spoke with the patient to advise her of Susan's recommendations. She verbalized understanding and is okay with proceeding with [facility] main [place]. I called [facility] centralized scheduling and spoke with Alvina to get the MRI w sedation scheduled. She had to leave a  for the sedation team to give her a call back as they do get off at 4:00. If [facility] scheduling calls back tomorrow, please ensure the MRI is scheduled BEFORE the appointment scheduled with Dr. Ramirez on 12/26/24. Thank you!

## 2024-11-07 NOTE — TELEPHONE ENCOUNTER
The patient called the office this afternoon to let us know she was unable to complete her MRI that was scheduled for today at 9:30. She could not go through with it even on Valium because the radiologist said she was turning white. She has an upcoming appointment with Dr. Ramirez on 12/26/24. Do you want to try MRI w sedation?? Not sure how far they are booked out.

## 2024-11-12 ENCOUNTER — TELEPHONE (OUTPATIENT)
Dept: NEUROSURGERY | Facility: CLINIC | Age: 59
End: 2024-11-12
Payer: MEDICARE

## 2024-12-19 ENCOUNTER — TELEPHONE (OUTPATIENT)
Dept: NEUROSURGERY | Facility: CLINIC | Age: 59
End: 2024-12-19
Payer: MEDICARE

## 2024-12-19 NOTE — TELEPHONE ENCOUNTER
I returned her call regarding this. She stated she called us because MARIOLA told her to call us. I advised her I will have her images pushed over tomorrow so we can have everything together prior to her upcoming appointment with Dr. Ramirez for 12/26/24. She verbalized understanding.

## 2024-12-25 NOTE — PROGRESS NOTES
[facility]   Neurosurgery      Kimberley Davenport  MRN: [id number], CSN: [id number]      : 1965   Age: 59 y.o. female  Payor: [employer] MANAGED MEDICARE / Plan: [employer] MEDICARE PLAN PPO / Product Type: Medicare Advantage /       Ref:  No referring provider defined for this encounter.    PCP: Ute Amin FNP-C    Visit Date: 2024     Patient Active Problem List   Diagnosis    Closed fracture of second lumbar vertebra       SUBJECTIVE:      CC:   Chief Complaint   Patient presents with    L2 fracture s/p falls x 2 with chronic low back pain       HPI:   Ms. Davenport is a 59 y.o. female who has a past medical history significant for hypertension, obesity with a BMI of 55.88, anxiety, and depression.  The patient is s/p a right L4-5 and right L5-S1 laminotomy surgery approximately 25 years ago.  She presents as a follow up patient in the neurosurgery clinic for progressively worsening chronic low back pain.  Ms. Davenport sustained a L2 vertebral body fracture s/p fall 5 months ago on 2024 after a fall and is s/p another fall 2 months ago on 10/20/2024 when her right leg gave out from under her.  She has been managed nonoperatively in an Aspen TLSO brace.      The patient's last visit in the neurosurgery clinic was on 10/23/2024 with YUDY Davis.  The plan of care was for Ms. Davenport to continue wearing her TLSO brace whenever her head of bed was greater than 30°, reduce her weight, and complete a MRI lumbar spine without gadolinium.  The patient completed her MRI study with finalized imaging results to be reviewed during today's appointment.  She has been compliant in wearing her TLSO brace, which she is wearing today during her visit.  Ms. Lynn reports a 20 lb weight loss since starting Mounjaro about 1 month ago.  Her current weight is 346 lbs.    The patient visits the neurosurgery clinic with her cousin, who provides some of the medical history.  Ms. Davenport reports  that after her right L4-5 and right L5-S1 laminotomy surgery when she was in her 30s, she had        Since her most recent falls, the patient has been experiencing urinary and bowel urgency.      The patient has been evaluated in the past by neurosurgeon Dr. Brice in [place], [place]    Flexeril and Lyrica  She completed serial lumbar epidural steroid injections approximately 3 years ago in [place] without significant improvement.  Recent use of a TENS unit has been partially beneficial      Patient Active Problem List    Diagnosis Date Noted    Closed fracture of second lumbar vertebra 07/25/2024     Past Medical History:   Diagnosis Date    Anxiety disorder, unspecified     DDD (degenerative disc disease), lumbar     Depression     Dorsalgia of lumbar region     Fracture of lumbar spine     Hypertension     Low back pain     Obesity, unspecified     Other closed fracture of second lumbar vertebra, initial encounter     Pelvic fracture      Past Surgical History:   Procedure Laterality Date    APPENDECTOMY      BLADDER REPAIR      CHOLECYSTECTOMY      COLONOSCOPY      DECOMPRESSION OF LUMBAR SPINE USING MINIMALLY INVASIVE TECHNIQUE      HYSTERECTOMY      LUMBAR SPINE SURGERY      TOTAL KNEE ARTHROPLASTY Right        Current Outpatient Medications:     acetaminophen (TYLENOL) 325 MG tablet, Take 325 mg by mouth every 6 (six) hours as needed for Pain., Disp: , Rfl:     aspirin (ECOTRIN) 81 MG EC tablet, Take 1 tablet by mouth every morning., Disp: , Rfl:     buPROPion (WELLBUTRIN XL) 300 MG 24 hr tablet, Take 300 mg by mouth., Disp: , Rfl:     citalopram (CELEXA) 10 MG tablet, Take 10 mg by mouth., Disp: , Rfl:     cyclobenzaprine (FLEXERIL) 10 MG tablet, Take 10 mg by mouth nightly as needed., Disp: , Rfl:     ibuprofen (ADVIL,MOTRIN) 200 MG tablet, Take 200 mg by mouth every 6 (six) hours as needed for Pain., Disp: , Rfl:     lisinopriL (PRINIVIL,ZESTRIL) 20 MG tablet, Take 20 mg by mouth., Disp: ,  "Rfl:     pregabalin (LYRICA) 150 MG capsule, Take 150 mg by mouth 3 (three) times daily., Disp: , Rfl:     rOPINIRole (REQUIP) 1 MG tablet, Take 1 mg by mouth., Disp: , Rfl:     rosuvastatin (CRESTOR) 5 MG tablet, Take 5 mg by mouth., Disp: , Rfl:     tirzepatide (MOUNJARO) 2.5 mg/0.5 mL PnIj, Inject 2.5 mg into the skin., Disp: , Rfl:     Review of patient's allergies indicates:  No Known Allergies    Social History     Tobacco Use    Smoking status: Never    Smokeless tobacco: Never   Substance Use Topics    Alcohol use: Never     Occupation:    Family History   Problem Relation Name Age of Onset    Hypertension Mother      Lumbar disc disease Mother      Alcohol abuse Father      Emphysema Father      Alcohol abuse Sister      COPD Sister      Lumbar disc disease Brother      Cancer Brother      Alcohol abuse Brother      Dementia Brother      Dementia Maternal Grandmother         ROS:  Constitutional:  Negative for chills and fever.   HENT:  Negative for congestion and sore throat.    Eyes:  Negative for blurred vision and double vision.   Respiratory:  Negative for cough and shortness of breath.    Cardiovascular:  Negative for chest pain and palpitations.   Gastrointestinal:  Negative for constipation, diarrhea, nausea and vomiting.   Musculoskeletal:  Positive for back pain, Negative for neck pain.   Neurological:  Negative for sensory change, focal weakness and headaches.   Endo/Heme/Allergies:  Does not bruise/bleed easily.   Psychiatric/Behavioral:  Positive for depression and anxiety.      OBJECTIVE:     EXAMINATION:  /72 (BP Location: Left arm, Patient Position: Sitting)   Pulse 78   Resp 16   Ht 5' 6" (1.676 m)   Wt (!) 157 kg (346 lb 3.2 oz)   BMI 55.88 kg/m²   Body Habitus: Significantly Obese    Physical Exam:  Constitutional: The patient is well-developed and cooperative, sitting comfortably in a chair.  She appears extremely deconditioned.    Mental Status:   Oriented to person, place, " and time  Normal speech    Motor:  Muscle bulk: normal in all extremities  Tone: normal in all extremities    Upper extremities:  Deltoid: right 5/5; left 5/5  Biceps: right 5/5; left 5/5  Triceps: right 5/5; left 5/5  : right 5/5; left 5/5  Interosseous muscles: right 5/5; left 5/5    Lower extremities:  Hip flexors: right 5/5; left 5/5  Knee extensors: right 5/5; left 5/5  Knee flexors: right 5/5; left 5/5  Foot dorsiflexors: right 5/5; left 5/5  Foot plantar flexors: right 5/5; left 5/5  Extensor hallucis longus: right 5/5; left 5/5    Sensation:  Normal to light touch x 4 extremities    Reflexes:  Blevinss sign: right negative; left negative  Babinski: right downgoing; left downgoing  Clonus: right negative; left negative    Coordination:  Finger to nose: right normal; left normal    Musculoskeletal:    Gait:    Straight leg test: right negative; left negative    Cervical: No pain with palpation  Upper back: No pain with palpation  Lower back: No pain with palpation      DIAGNOSTICS REVIEW OF IMAGING, LAB & OTHER STUDIES:  I have personally reviewed and evaluated the following reports as well as radiographic studies:    Lumbar spine x-rays, 10/21/2024- limited imaging quality secondary to large body habitus.  There is normal alignment with multilevel degenerative disc disease in the lumbar spine as well as in the bilateral SI joints.    CT lumbar spine without contrast, 10/20/2024- there is normal alignment with extensive anterior and posterior osteophytes.  There is a right oblique fracture involving of the L2 vertebral body with healing sclerotic borders.  The bilateral L2 pedicles are intact.  This L2 vertebral body has no significant loss of height, retropulsion, or kyphosis.    MRI lumbar spine without gadolinium, 12/19/2024- there is Grade I anterior spondylolisthesis of L5 on S1.  Within the L2 vertebral body, there is a right paramedian fracture extending to the superior end with residual edema,  indicating a subacute on chronic condition.  The bilateral L2 pedicles are intact with no significant retropulsion or kyphosis.  At L2-3, there is a disc bulge associated with moderate stenosis and bilateral neuroforaminal narrowing.  At L3-4, there is right neuroforaminal narrowing.  Postoperative changes s/p right L4-5 and right L5-S1 laminotomies.  At both the L4-5 and L5-S1 levels, there is bilateral neuroforaminal narrowing.      ASSESSMENT:  Ms. Davenport is a 59 y.o. female who has a past medical history significant for hypertension, obesity with a BMI of 55.88, anxiety, and depression.  The patient is s/p a right L4-5 and right L5-S1 laminotomy surgery approximately 25 years ago.  She presents as a follow up patient in the neurosurgery clinic for progressively worsening chronic low back pain.  Ms. Davenport sustained a L2 vertebral body fracture s/p fall 5 months ago on 07/24/2024 after a fall and is s/p another fall 2 months ago on 10/20/2024 when her right leg gave out from under her.  She has been managed nonoperatively in an Aspen TLSO brace.  Ms. Davenport has a normal motor exam except for 4/5 strength in her right iliopsoas secondary to pain.  The patient has numbness in her right leg and particularly in her bilateral feet.    I reviewed pertinent imaging studies with the patient and her cousin.  A MRI lumbar spine without gadolinium demonstrates Grade I anterior spondylolisthesis of L5 on S1.  Within the L2 vertebral body, there is a right paramedian fracture extending to the superior end with residual edema, indicating a subacute on chronic condition.  The bilateral L2 pedicles are intact with no significant retropulsion or kyphosis.  At L2-3, there is a disc bulge associated with moderate stenosis and bilateral neuroforaminal narrowing.  At L3-4, there is right neuroforaminal narrowing.  Postoperative changes s/p right L4-5 and right L5-S1 laminotomies.  At both the L4-5 and L5-S1 levels, there is  bilateral neuroforaminal narrowing.        PLAN:    Encounter Diagnoses   Name Primary?    Lumbar stenosis with neurogenic claudication Yes    Lumbar disc herniation     Closed fracture of second lumbar vertebra with delayed healing, unspecified fracture morphology, subsequent encounter     Chronic bilateral low back pain with right-sided sciatica     Bowel and bladder incontinence     Age-related osteoporosis with current pathological fracture, vertebra(e), initial encounter for fracture      Orders Placed This Encounter   Procedures    CT Lumbar Spine Without Contrast    DXA Bone Density Appendicular Skeleton    DXA Bone Density Axial Skeleton 1 or more sites    Ambulatory referral/consult to Urology    Ambulatory referral/consult to Gastroenterology        1.  I discussed with Ms. Davenport and her cousin that her slowly healing L2 vertebral body fracture will continue to be managed nonoperatively.  She is recommended to continue wearing her Aspen TLSO brace whenever her head of bed is greater than 30°.    2.  To further evaluate her bone density, I am ordering a DEXA scan.  The patient will be contacted with these radiographic results and any updates to the plan of care.    3.  I am ordering a CT lumbar spine without contrast to be completed in 6 weeks.    4.  I offered to refer Ms. Davenport to [facility] for evaluation and consideration lumbar steroid injection.  However, the patient has not had any benefit from lumbar epidural steroid injections in the past, so she would like to hold off on this referral.    5.  The patient requested optimizing her pain medication regimen.  I advised her to discontinue taking NSAIDs such as ibuprofen and Aleve, as these medications inhibit bony healing.  Ms. Davenport is recommended to follow up with her primary care provider to optimize her pain medications.    6.  She is also being referred to a urologist at [facility] for urinary urgency with chronic  incontinence.     7.  In addition, the patient is being referred to gastroenterology for bowel urgency with chronic incontinence.    8.  The patient's BMI is significantly elevated at 55.88.  She is currently on Mounjaro.  I recommend a 100 lb weight loss goal before she would be considered for any type of elective lumbar surgery.  We discussed weight loss goals with diet and exercise.  Maintaining a healthy weight and strengthening core muscles are important factors for reducing pain along the spine.     9.  Arrangements are being made for Ms. Davenport to follow up in the neurosurgery clinic in 6 weeks with YUDY Davis.  Approximately 2 days prior to this visit, she needs to have completed an updated CT lumbar spine without contrast.       This note will be sent to the patient's referring provider No ref. provider found and primary care provider Ute Amin FNP-C.           Joseline Ramirez MD  Neurosurgeon

## 2024-12-26 ENCOUNTER — OFFICE VISIT (OUTPATIENT)
Dept: NEUROSURGERY | Facility: CLINIC | Age: 59
End: 2024-12-26
Payer: MEDICARE

## 2024-12-26 VITALS
RESPIRATION RATE: 16 BRPM | DIASTOLIC BLOOD PRESSURE: 72 MMHG | HEART RATE: 78 BPM | WEIGHT: 293 LBS | BODY MASS INDEX: 47.09 KG/M2 | HEIGHT: 66 IN | SYSTOLIC BLOOD PRESSURE: 107 MMHG

## 2024-12-26 DIAGNOSIS — M80.08XA AGE-RELATED OSTEOPOROSIS WITH CURRENT PATHOLOGICAL FRACTURE, VERTEBRA(E), INITIAL ENCOUNTER FOR FRACTURE: ICD-10-CM

## 2024-12-26 DIAGNOSIS — G89.29 CHRONIC BILATERAL LOW BACK PAIN WITH RIGHT-SIDED SCIATICA: ICD-10-CM

## 2024-12-26 DIAGNOSIS — M48.062 LUMBAR STENOSIS WITH NEUROGENIC CLAUDICATION: Primary | ICD-10-CM

## 2024-12-26 DIAGNOSIS — R32 BOWEL AND BLADDER INCONTINENCE: ICD-10-CM

## 2024-12-26 DIAGNOSIS — M54.41 CHRONIC BILATERAL LOW BACK PAIN WITH RIGHT-SIDED SCIATICA: ICD-10-CM

## 2024-12-26 DIAGNOSIS — R15.9 BOWEL AND BLADDER INCONTINENCE: ICD-10-CM

## 2024-12-26 DIAGNOSIS — S32.029G CLOSED FRACTURE OF SECOND LUMBAR VERTEBRA WITH DELAYED HEALING, UNSPECIFIED FRACTURE MORPHOLOGY, SUBSEQUENT ENCOUNTER: ICD-10-CM

## 2024-12-26 DIAGNOSIS — M51.26 LUMBAR DISC HERNIATION: ICD-10-CM

## 2024-12-26 PROCEDURE — 1159F MED LIST DOCD IN RCRD: CPT | Mod: CPTII,,, | Performed by: NEUROLOGICAL SURGERY

## 2024-12-26 PROCEDURE — 3078F DIAST BP <80 MM HG: CPT | Mod: CPTII,,, | Performed by: NEUROLOGICAL SURGERY

## 2024-12-26 PROCEDURE — 1160F RVW MEDS BY RX/DR IN RCRD: CPT | Mod: CPTII,,, | Performed by: NEUROLOGICAL SURGERY

## 2024-12-26 PROCEDURE — 3008F BODY MASS INDEX DOCD: CPT | Mod: CPTII,,, | Performed by: NEUROLOGICAL SURGERY

## 2024-12-26 PROCEDURE — 99214 OFFICE O/P EST MOD 30 MIN: CPT | Mod: ,,, | Performed by: NEUROLOGICAL SURGERY

## 2024-12-26 PROCEDURE — 4010F ACE/ARB THERAPY RXD/TAKEN: CPT | Mod: CPTII,,, | Performed by: NEUROLOGICAL SURGERY

## 2024-12-26 PROCEDURE — 3074F SYST BP LT 130 MM HG: CPT | Mod: CPTII,,, | Performed by: NEUROLOGICAL SURGERY

## 2024-12-26 RX ORDER — ACETAMINOPHEN 325 MG/1
325 TABLET ORAL EVERY 6 HOURS PRN
COMMUNITY

## 2024-12-26 RX ORDER — ASPIRIN 81 MG/1
1 TABLET ORAL EVERY MORNING
COMMUNITY

## 2024-12-26 RX ORDER — IBUPROFEN 200 MG
200 TABLET ORAL EVERY 6 HOURS PRN
COMMUNITY

## 2024-12-26 RX ORDER — TIRZEPATIDE 2.5 MG/.5ML
2.5 INJECTION, SOLUTION SUBCUTANEOUS
COMMUNITY

## 2024-12-26 NOTE — PROGRESS NOTES
[facility]   Neurosurgery      Kimberley Davenport  MRN: [id number], CSN: [id number]      : 1965   Age: 59 y.o. female  Payor: [employer] MANAGED MEDICARE / Plan: [employer] MEDICARE PLAN PPO / Product Type: Medicare Advantage /       Ref:  No referring provider defined for this encounter.    PCP: Ute Amin FNP-C    Visit Date: 2024     Patient Active Problem List   Diagnosis    Closed fracture of second lumbar vertebra       SUBJECTIVE:      CC:   Chief Complaint   Patient presents with    L2 fracture s/p falls x 2 with chronic low back pain       HPI:   Ms. Davenport is a 59 y.o. female who has a past medical history significant for hypertension, obesity with a BMI of 55.88, anxiety, and depression.  The patient is s/p a right L4-5 and right L5-S1 laminotomy surgery approximately 25 years ago.  She presents as a follow up patient in the neurosurgery clinic for progressively worsening chronic low back pain.  Ms. Davenport sustained a L2 vertebral body fracture s/p fall 5 months ago on 2024 and is s/p another fall 2 months ago on 10/20/2024 when her right leg gave out from under her.  She has been managed nonoperatively in an Aspen TLSO brace.      The patient's last visit in the neurosurgery clinic was on 10/23/2024 with YUDY Davis.  The plan of care was for Ms. Davenport to continue wearing her TLSO brace whenever her head of bed was greater than 30°, reduce her weight, and complete a MRI lumbar spine without gadolinium.  The patient completed her MRI study with finalized imaging results to be reviewed during today's appointment.  She has been compliant in wearing her TLSO brace, which she is wearing today during her visit.  Ms. Lynn reports a 20 lb weight loss since starting Mounjaro about 1 month ago.  Her current weight is 346 lbs.    The patient visits the neurosurgery clinic with her cousin, who provides some of the medical history.  Ms. Davenport reports that after  "her right L4-5 and right L5-S1 laminotomy surgery when she was in her 30s, she had postoperative improvement in her low back pain.  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  However, in 2021, the patient had a lifting injury at work with increased low back pain radiating into her right buttock and right leg with numbness in her right toes.  Ms. Lynn was evaluated by neurosurgeon Dr. Brice in [place], [place] at that time.  She optimized medical management with home health in 2021.  Physical therapy for her right knee pain and some initial exercises for her low back pain significantly increased the pain along her spinal axis.  In addition, the patient completed serial lumbar epidural steroid injections in 2021 in [place] without significant improvement.    Ms. Davenport sustained a L2 vertebral body fracture s/p fall 5 months ago on 07/24/2024 and is s/p another fall 2 months ago on 10/20/2024 when her right leg gave out from under her.  She has been managed nonoperatively in an Aspen TLSO brace.  Since these most recent falls, her pain level has significantly increased.  The patient rates her pain level as a 7/10.  Her low back to right leg pain ratio is 50:50.  Although Ms. Lynn does not have any focal weakness, her right leg gives out from under her at times when standing.  There is chronic numbness in her right leg and right foot.  More recently, her left foot has been tingling.  Since 2021, the patient has had bowel as well as bladder urgency as well as intermittent incontinence.  These symptoms have progressively worsened in the last year.  Ms. Lynn wears Depends and pads.  She mentions having a hysterectomy and "bladder tack-up surgery" while in her 20s.  The patient is not currently followed by a urologist or gastroenterologist.  Ms. Lynn does not have any saddle anesthesia.  She has been mobilizing with a seated walker since 2021.     There is increased low back pain with " walking.  There is a partial reduction of pain when sitting in a recliner and when using a TENS unit.  She has tried taking Tylenol, Advil, Celexa, Lyrica, and Flexeril.      Patient Active Problem List    Diagnosis Date Noted    Closed fracture of second lumbar vertebra 07/25/2024     Past Medical History:   Diagnosis Date    Anxiety disorder, unspecified     DDD (degenerative disc disease), lumbar     Depression     Dorsalgia of lumbar region     Fracture of lumbar spine     Hypertension     Low back pain     Obesity, unspecified     Other closed fracture of second lumbar vertebra, initial encounter     Pelvic fracture      Past Surgical History:   Procedure Laterality Date    APPENDECTOMY      BLADDER REPAIR      CHOLECYSTECTOMY      COLONOSCOPY      DECOMPRESSION OF LUMBAR SPINE USING MINIMALLY INVASIVE TECHNIQUE      HYSTERECTOMY      LUMBAR SPINE SURGERY      TOTAL KNEE ARTHROPLASTY Right        Current Outpatient Medications:     acetaminophen (TYLENOL) 325 MG tablet, Take 325 mg by mouth every 6 (six) hours as needed for Pain., Disp: , Rfl:     aspirin (ECOTRIN) 81 MG EC tablet, Take 1 tablet by mouth every morning., Disp: , Rfl:     buPROPion (WELLBUTRIN XL) 300 MG 24 hr tablet, Take 300 mg by mouth., Disp: , Rfl:     citalopram (CELEXA) 10 MG tablet, Take 10 mg by mouth., Disp: , Rfl:     cyclobenzaprine (FLEXERIL) 10 MG tablet, Take 10 mg by mouth nightly as needed., Disp: , Rfl:     ibuprofen (ADVIL,MOTRIN) 200 MG tablet, Take 200 mg by mouth every 6 (six) hours as needed for Pain., Disp: , Rfl:     lisinopriL (PRINIVIL,ZESTRIL) 20 MG tablet, Take 20 mg by mouth., Disp: , Rfl:     pregabalin (LYRICA) 150 MG capsule, Take 150 mg by mouth 3 (three) times daily., Disp: , Rfl:     rOPINIRole (REQUIP) 1 MG tablet, Take 1 mg by mouth., Disp: , Rfl:     rosuvastatin (CRESTOR) 5 MG tablet, Take 5 mg by mouth., Disp: , Rfl:     tirzepatide (MOUNJARO) 2.5 mg/0.5 mL PnIj, Inject 2.5 mg into the skin., Disp: , Rfl:  "    Review of patient's allergies indicates:  No Known Allergies    Social History     Tobacco Use    Smoking status: Never    Smokeless tobacco: Never   Substance Use Topics    Alcohol use: Never     Occupation: Medically disabled in 2021 due to low back pain from a work related injury    Family History   Problem Relation Name Age of Onset    Hypertension Mother      Lumbar disc disease Mother      Alcohol abuse Father      Emphysema Father      Alcohol abuse Sister      COPD Sister      Lumbar disc disease Brother      Cancer Brother      Alcohol abuse Brother      Dementia Brother      Dementia Maternal Grandmother         ROS:  Constitutional:  Negative for chills and fever.   HENT:  Negative for congestion and sore throat.    Eyes:  Negative for blurred vision and double vision.   Respiratory:  Negative for cough and shortness of breath.    Cardiovascular:  Negative for chest pain and palpitations.   Gastrointestinal:  Positive for constipation, Negative for diarrhea, nausea and vomiting.   Musculoskeletal:  Positive for back pain, Negative for neck pain.   Neurological:  Positive for sensory change, Negative for focal weakness and headaches.   Endo/Heme/Allergies:  Does not bruise/bleed easily.   Psychiatric/Behavioral:  Positive for depression and anxiety.      OBJECTIVE:     EXAMINATION:  /72 (BP Location: Left arm, Patient Position: Sitting)   Pulse 78   Resp 16   Ht 5' 6" (1.676 m)   Wt (!) 157 kg (346 lb 3.2 oz)   BMI 55.88 kg/m²   Body Habitus: Significantly Obese    Physical Exam:  Constitutional: The patient is well-developed and cooperative, sitting comfortably in a chair.  She appears extremely deconditioned and is slow to stand from a seated position.  The patient is wearing an Aspen TLSO brace.    Mental Status:   Oriented to person, place, and time  Normal speech    Motor:  Muscle bulk: normal in all extremities  Tone: normal in all extremities    Upper extremities:  Deltoid: right 5/5; " left 5/5  Biceps: right 5/5; left 5/5  Triceps: right 5/5; left 5/5  : right 5/5; left 5/5  Interosseous muscles: right 5/5; left 5/5    Lower extremities:  Hip flexors: right 4/5, limited by pain; left 5/5  Knee extensors: right 5/5; left 5/5  Knee flexors: right 5/5; left 5/5  Foot dorsiflexors: right 5/5; left 5/5  Foot plantar flexors: right 5/5; left 5/5  Extensor hallucis longus: right 5/5; left 5/5    Sensation:  Normal to light touch in b UE  Decreased sensation to light touch in R lateral leg and B feet    Reflexes:  Blevinss sign: right negative; left negative  Babinski: right downgoing; left downgoing  Clonus: right negative; left negative    Musculoskeletal:    Gait: slow, guarded    Straight leg test: right positive; left negative    Cervical: No pain with palpation  Upper back: No pain with palpation  Lower back: Significant pain with palpation across lower back and in region of R upper buttock      DIAGNOSTICS REVIEW OF IMAGING, LAB & OTHER STUDIES:  I have personally reviewed and evaluated the following reports as well as radiographic studies:    Lumbar spine x-rays, 10/21/2024- limited imaging quality secondary to large body habitus.  There is normal alignment with multilevel degenerative disc disease in the lumbar spine as well as in the bilateral SI joints.    CT lumbar spine without contrast, 10/20/2024- there is normal alignment with extensive anterior and posterior osteophytes.  There is a right oblique fracture involving of the L2 vertebral body with healing sclerotic borders.  The bilateral L2 pedicles are intact.  This L2 vertebral body has no significant loss of height, retropulsion, or kyphosis.    MRI lumbar spine without gadolinium, 12/19/2024- there is Grade I anterior spondylolisthesis of L5 on S1.  Within the L2 vertebral body, there is a right paramedian fracture extending to the superior end with residual edema, indicating a subacute on chronic condition.  The bilateral L2  pedicles are intact with no significant retropulsion or kyphosis.  At L2-3, there is a disc bulge associated with moderate stenosis and bilateral neuroforaminal narrowing.  At L3-4, there is right neuroforaminal narrowing.  Postoperative changes s/p right L4-5 and right L5-S1 laminotomies.  At both the L4-5 and L5-S1 levels, there is bilateral neuroforaminal narrowing.      ASSESSMENT:  Ms. Davenport is a 59 y.o. female who has a past medical history significant for hypertension, obesity with a BMI of 55.88, anxiety, and depression.  The patient is s/p a right L4-5 and right L5-S1 laminotomy surgery approximately 25 years ago.  She presents as a follow up patient in the neurosurgery clinic for progressively worsening chronic low back pain.  Ms. Davenoprt sustained a L2 vertebral body fracture s/p fall 5 months ago on 07/24/2024 and is s/p another fall 2 months ago on 10/20/2024 when her right leg gave out from under her.  She has been managed nonoperatively in an Aspen TLSO brace.  Ms. Davenprot has a normal motor exam except for 4/5 strength in her right iliopsoas secondary to pain.  The patient has numbness in her right leg and particularly in her bilateral feet.    I reviewed pertinent imaging studies with the patient and her cousin.  A MRI lumbar spine without gadolinium demonstrates Grade I anterior spondylolisthesis of L5 on S1.  Within the L2 vertebral body, there is a right paramedian fracture extending to the superior end with residual edema, indicating a subacute on chronic condition.  The bilateral L2 pedicles are intact with no significant retropulsion or kyphosis.  At L2-3, there is a disc bulge associated with moderate stenosis and bilateral neuroforaminal narrowing.  At L3-4, there is right neuroforaminal narrowing.  Postoperative changes s/p right L4-5 and right L5-S1 laminotomies.  At both the L4-5 and L5-S1 levels, there is bilateral neuroforaminal narrowing.        PLAN:    Encounter Diagnoses    Name Primary?    Lumbar stenosis with neurogenic claudication Yes    Lumbar disc herniation     Closed fracture of second lumbar vertebra with delayed healing, unspecified fracture morphology, subsequent encounter     Chronic bilateral low back pain with right-sided sciatica     Bowel and bladder incontinence     Age-related osteoporosis with current pathological fracture, vertebra(e), initial encounter for fracture      Orders Placed This Encounter   Procedures    CT Lumbar Spine Without Contrast    DXA Bone Density Appendicular Skeleton    DXA Bone Density Axial Skeleton 1 or more sites    Ambulatory referral/consult to Urology    Ambulatory referral/consult to Gastroenterology        1.  I discussed with Ms. Davenport and her cousin that her slowly healing L2 vertebral body fracture will continue to be managed nonoperatively.  She is recommended to continue wearing her Aspen TLSO brace whenever her head of bed is greater than 30°.    2.  To further evaluate her bone density, I am ordering a DEXA scan.  The patient will be contacted with these radiographic results and any updates to the plan of care.    3.  I am ordering a CT lumbar spine without contrast to be completed in 6 weeks.    4.  I offered to refer Ms. Davenport to [facility] for evaluation and consideration lumbar steroid injection.  However, the patient has not had any benefit from lumbar epidural steroid injections in the past, so she would like to hold off on this referral.    5.  The patient requested optimizing her pain medication regimen.  I advised her to discontinue taking NSAIDs such as ibuprofen and Aleve, as these medications inhibit bony healing.  Ms. Davenport is recommended to follow up with her primary care provider to optimize her pain medications.    6.  She is also being referred to a urologist at [facility] for urinary urgency with chronic incontinence.     7.  In addition, the patient is being referred to  gastroenterology for bowel urgency with chronic incontinence.    8.  The patient's BMI is significantly elevated at 55.88.  She is currently on Mounjaro.  I recommend a 100 lb weight loss goal before she would be considered for any type of elective lumbar surgery.  We discussed weight loss goals with diet and exercise.  Maintaining a healthy weight and strengthening core muscles are important factors for reducing pain along the spine.     9.  Arrangements are being made for Ms. Davenport to follow up in the neurosurgery clinic in 6 weeks with YUDY Davis.  Approximately 2 days prior to this visit, she needs to have completed an updated CT lumbar spine without contrast.       This note will be sent to the patient's referring provider No ref. provider found and primary care provider Ute Amin FNP-C.           Joseline Ramirez MD  Neurosurgeon

## 2024-12-26 NOTE — PATIENT INSTRUCTIONS
Ms. Davenport is a 59 y.o. female who has a past medical history significant for hypertension, obesity with a BMI of 55.88, anxiety, and depression.  The patient is s/p a right L4-5 and right L5-S1 laminotomy surgery approximately 25 years ago.  She presents as a follow up patient in the neurosurgery clinic for progressively worsening chronic low back pain.  Ms. Davenport sustained a L2 vertebral body fracture s/p fall 5 months ago on 07/24/2024 and is s/p another fall 2 months ago on 10/20/2024 when her right leg gave out from under her.  She has been managed nonoperatively in an Aspen TLSO brace.  Ms. Davenport has a normal motor exam except for 4/5 strength in her right iliopsoas secondary to pain.  The patient has numbness in her right leg and particularly in her bilateral feet.    I reviewed pertinent imaging studies with the patient and her cousin.  A MRI lumbar spine without gadolinium demonstrates Grade I anterior spondylolisthesis of L5 on S1.  Within the L2 vertebral body, there is a right paramedian fracture extending to the superior end with residual edema, indicating a subacute on chronic condition.  The bilateral L2 pedicles are intact with no significant retropulsion or kyphosis.  At L2-3, there is a disc bulge associated with moderate stenosis and bilateral neuroforaminal narrowing.  At L3-4, there is right neuroforaminal narrowing.  Postoperative changes s/p right L4-5 and right L5-S1 laminotomies.  At both the L4-5 and L5-S1 levels, there is bilateral neuroforaminal narrowing.        PLAN:    Encounter Diagnoses   Name Primary?    Lumbar stenosis with neurogenic claudication Yes    Lumbar disc herniation     Closed fracture of second lumbar vertebra with delayed healing, unspecified fracture morphology, subsequent encounter     Chronic bilateral low back pain with right-sided sciatica     Bowel and bladder incontinence     Age-related osteoporosis with current pathological fracture, vertebra(e),  initial encounter for fracture      Orders Placed This Encounter   Procedures    CT Lumbar Spine Without Contrast    DXA Bone Density Appendicular Skeleton    DXA Bone Density Axial Skeleton 1 or more sites    Ambulatory referral/consult to Urology    Ambulatory referral/consult to Gastroenterology        1.  I discussed with Ms. Davenport and her cousin that her slowly healing L2 vertebral body fracture will continue to be managed nonoperatively.  She is recommended to continue wearing her Aspen TLSO brace whenever her head of bed is greater than 30°.    2.  To further evaluate her bone density, I am ordering a DEXA scan.  The patient will be contacted with these radiographic results and any updates to the plan of care.    3.  I am ordering a CT lumbar spine without contrast to be completed in 6 weeks.    4.  I offered to refer Ms. Davenport to [facility] pain [facility] for evaluation and consideration lumbar steroid injection.  However, the patient has not had any benefit from lumbar epidural steroid injections in the past, so she would like to hold off on this referral.    5.  The patient requested optimizing her pain medication regimen.  I advised her to discontinue taking NSAIDs such as ibuprofen and Aleve, as these medications inhibit bony healing.  Ms. Davenport is recommended to follow up with her primary care provider to optimize her pain medications.    6.  She is also being referred to a urologist at [facility] for urinary urgency with chronic incontinence.     7.  In addition, the patient is being referred to gastroenterology for bowel urgency with chronic incontinence.    8.  The patient's BMI is significantly elevated at 55.88.  She is currently on Mounjaro.  I recommend a 100 lb weight loss goal before she would be considered for any type of elective lumbar surgery.  We discussed weight loss goals with diet and exercise.  Maintaining a healthy weight and strengthening core muscles are important  factors for reducing pain along the spine.     9.  Arrangements are being made for Ms. Davenport to follow up in the neurosurgery clinic in 6 weeks with YUDY Davis.  Approximately 2 days prior to this visit, she needs to have completed an updated CT lumbar spine without contrast.       This note will be sent to the patient's referring provider No ref. provider found and primary care provider Ute Amin FNP-C.     Xerosis Patient Specific Treatment: Pt can apply Cerave and use steroid cream(Triamcinolone cream) as needed

## 2025-01-02 ENCOUNTER — TELEPHONE (OUTPATIENT)
Dept: NEUROSURGERY | Facility: CLINIC | Age: 60
End: 2025-01-02
Payer: MEDICARE

## 2025-01-02 NOTE — TELEPHONE ENCOUNTER
I spoke with the patient to see if she has heard from [facility] along with [facility] regarding an appointment. She stated she has not heard anything but will not be moving forward with any of that. She is now seeing her previous neurosurgereon that she saw 2 years ago. She does not wish to proceed with us. I cancelled all her upcoming appointments with us.

## 2025-08-11 ENCOUNTER — LAB VISIT (OUTPATIENT)
Dept: LAB | Facility: HOSPITAL | Age: 60
End: 2025-08-11
Attending: NURSE PRACTITIONER
Payer: MEDICARE

## 2025-08-11 DIAGNOSIS — Z13.6 SCREENING FOR ISCHEMIC HEART DISEASE: Primary | ICD-10-CM

## 2025-08-11 DIAGNOSIS — Z79.899 POLYPHARMACY: ICD-10-CM

## 2025-08-11 DIAGNOSIS — Z13.29 SCREENING FOR THYROID DISORDER: ICD-10-CM

## 2025-08-11 LAB
ALBUMIN SERPL-MCNC: 3.4 G/DL (ref 3.4–4.8)
ALBUMIN/GLOB SERPL: 1.3 RATIO (ref 1.1–2)
ALP SERPL-CCNC: 136 UNIT/L (ref 40–150)
ALT SERPL-CCNC: 12 UNIT/L (ref 0–55)
ANION GAP SERPL CALC-SCNC: 5 MEQ/L
AST SERPL-CCNC: 15 UNIT/L (ref 11–45)
BASOPHILS # BLD AUTO: 0.04 X10(3)/MCL (ref 0.01–0.08)
BASOPHILS NFR BLD AUTO: 0.6 % (ref 0.1–1.2)
BILIRUB SERPL-MCNC: 0.3 MG/DL
BUN SERPL-MCNC: 21 MG/DL (ref 9.8–20.1)
CALCIUM SERPL-MCNC: 8.7 MG/DL (ref 8.4–10.2)
CHLORIDE SERPL-SCNC: 107 MMOL/L (ref 98–107)
CHOLEST SERPL-MCNC: 136 MG/DL
CHOLEST/HDLC SERPL: 3 {RATIO} (ref 0–5)
CO2 SERPL-SCNC: 31 MMOL/L (ref 23–31)
CREAT SERPL-MCNC: 1.19 MG/DL (ref 0.55–1.02)
CREAT/UREA NIT SERPL: 18
EOSINOPHIL # BLD AUTO: 0.13 X10(3)/MCL (ref 0.04–0.36)
EOSINOPHIL NFR BLD AUTO: 2 % (ref 0.7–7)
ERYTHROCYTE [DISTWIDTH] IN BLOOD BY AUTOMATED COUNT: 15.3 % (ref 11–14.5)
GFR SERPLBLD CREATININE-BSD FMLA CKD-EPI: 52 ML/MIN/1.73/M2
GLOBULIN SER-MCNC: 2.7 GM/DL (ref 2.4–3.5)
GLUCOSE SERPL-MCNC: 85 MG/DL (ref 82–115)
HCT VFR BLD AUTO: 41.1 % (ref 36–48)
HDLC SERPL-MCNC: 54 MG/DL (ref 35–60)
HGB BLD-MCNC: 12.9 G/DL (ref 11.8–16)
IMM GRANULOCYTES # BLD AUTO: 0.01 X10(3)/MCL (ref 0–0.03)
IMM GRANULOCYTES NFR BLD AUTO: 0.2 % (ref 0–0.5)
LDLC SERPL CALC-MCNC: 63 MG/DL (ref 50–140)
LYMPHOCYTES # BLD AUTO: 1.47 X10(3)/MCL (ref 1.16–3.74)
LYMPHOCYTES NFR BLD AUTO: 22.5 % (ref 20–55)
MCH RBC QN AUTO: 28 PG (ref 27–34)
MCHC RBC AUTO-ENTMCNC: 31.4 G/DL (ref 31–37)
MCV RBC AUTO: 89.3 FL (ref 79–99)
MONOCYTES # BLD AUTO: 0.47 X10(3)/MCL (ref 0.24–0.36)
MONOCYTES NFR BLD AUTO: 7.2 % (ref 4.7–12.5)
NEUTROPHILS # BLD AUTO: 4.41 X10(3)/MCL (ref 1.56–6.13)
NEUTROPHILS NFR BLD AUTO: 67.5 % (ref 37–73)
NRBC BLD AUTO-RTO: 0 %
PLATELET # BLD AUTO: 159 X10(3)/MCL (ref 140–371)
PMV BLD AUTO: 10.9 FL (ref 9.4–12.4)
POTASSIUM SERPL-SCNC: 4.9 MMOL/L (ref 3.5–5.1)
PROT SERPL-MCNC: 6.1 GM/DL (ref 5.8–7.6)
RBC # BLD AUTO: 4.6 X10(6)/MCL (ref 4–5.1)
SODIUM SERPL-SCNC: 143 MMOL/L (ref 136–145)
TRIGL SERPL-MCNC: 96 MG/DL (ref 37–140)
TSH SERPL-ACNC: 1.6 UIU/ML (ref 0.35–4.94)
VLDLC SERPL CALC-MCNC: 19 MG/DL
WBC # BLD AUTO: 6.53 X10(3)/MCL (ref 4–11.5)

## 2025-08-11 PROCEDURE — 36415 COLL VENOUS BLD VENIPUNCTURE: CPT

## 2025-08-11 PROCEDURE — 85025 COMPLETE CBC W/AUTO DIFF WBC: CPT

## 2025-08-11 PROCEDURE — 84443 ASSAY THYROID STIM HORMONE: CPT

## 2025-08-11 PROCEDURE — 80053 COMPREHEN METABOLIC PANEL: CPT

## 2025-08-11 PROCEDURE — 80061 LIPID PANEL: CPT
